# Patient Record
Sex: FEMALE | Race: WHITE | Employment: FULL TIME | URBAN - METROPOLITAN AREA
[De-identification: names, ages, dates, MRNs, and addresses within clinical notes are randomized per-mention and may not be internally consistent; named-entity substitution may affect disease eponyms.]

---

## 2017-02-10 PROBLEM — Z11.59 NEED FOR HEPATITIS C SCREENING TEST: Status: ACTIVE | Noted: 2017-02-10

## 2017-02-10 PROBLEM — Z00.00 LABORATORY EXAM ORDERED AS PART OF ROUTINE GENERAL MEDICAL EXAMINATION: Status: ACTIVE | Noted: 2017-02-10

## 2017-02-10 PROBLEM — Z12.31 ENCOUNTER FOR SCREENING MAMMOGRAM FOR BREAST CANCER: Status: ACTIVE | Noted: 2017-02-10

## 2017-02-10 PROBLEM — Z12.11 COLON CANCER SCREENING: Status: ACTIVE | Noted: 2017-02-10

## 2017-02-10 PROBLEM — H26.9 CATARACT OF LEFT EYE, UNSPECIFIED CATARACT TYPE: Status: ACTIVE | Noted: 2017-02-10

## 2017-02-10 PROBLEM — Z79.899 ENCOUNTER FOR LONG-TERM (CURRENT) USE OF MEDICATIONS: Status: ACTIVE | Noted: 2017-02-10

## 2017-03-24 PROBLEM — Z12.11 COLON CANCER SCREENING: Status: ACTIVE | Noted: 2017-03-24

## 2017-03-24 PROBLEM — H26.9: Status: ACTIVE | Noted: 2017-02-10

## 2017-03-24 PROBLEM — Z00.00 LABORATORY EXAM ORDERED AS PART OF ROUTINE GENERAL MEDICAL EXAMINATION: Status: ACTIVE | Noted: 2017-03-24

## 2017-03-24 PROBLEM — R73.01 ELEVATED FASTING GLUCOSE: Status: ACTIVE | Noted: 2017-03-24

## 2017-03-24 PROBLEM — Z79.899 ENCOUNTER FOR LONG-TERM (CURRENT) USE OF MEDICATIONS: Status: ACTIVE | Noted: 2017-03-24

## 2017-03-24 PROBLEM — Z12.31 ENCOUNTER FOR SCREENING MAMMOGRAM FOR BREAST CANCER: Status: ACTIVE | Noted: 2017-03-24

## 2017-03-24 PROBLEM — Z11.59 NEED FOR HEPATITIS C SCREENING TEST: Status: ACTIVE | Noted: 2017-03-24

## 2017-03-24 PROBLEM — E78.00 PURE HYPERCHOLESTEROLEMIA: Status: ACTIVE | Noted: 2017-03-24

## 2017-03-24 PROBLEM — I10 HYPERTENSION, ESSENTIAL, BENIGN: Status: ACTIVE | Noted: 2017-03-24

## 2017-06-11 PROBLEM — Z00.00 LABORATORY EXAM ORDERED AS PART OF ROUTINE GENERAL MEDICAL EXAMINATION: Status: ACTIVE | Noted: 2017-06-11

## 2017-06-11 PROBLEM — Z11.59 NEED FOR HEPATITIS C SCREENING TEST: Status: ACTIVE | Noted: 2017-06-11

## 2017-06-11 PROBLEM — Z12.31 ENCOUNTER FOR SCREENING MAMMOGRAM FOR BREAST CANCER: Status: ACTIVE | Noted: 2017-06-11

## 2017-06-11 PROBLEM — Z79.899 ENCOUNTER FOR LONG-TERM (CURRENT) USE OF MEDICATIONS: Status: ACTIVE | Noted: 2017-06-11

## 2017-06-11 PROBLEM — Z12.11 COLON CANCER SCREENING: Status: ACTIVE | Noted: 2017-06-11

## 2019-01-25 ENCOUNTER — APPOINTMENT (OUTPATIENT)
Dept: CT IMAGING | Facility: HOSPITAL | Age: 64
DRG: 101 | End: 2019-01-25
Attending: EMERGENCY MEDICINE
Payer: COMMERCIAL

## 2019-01-25 ENCOUNTER — APPOINTMENT (OUTPATIENT)
Dept: MRI IMAGING | Facility: HOSPITAL | Age: 64
DRG: 101 | End: 2019-01-25
Attending: Other
Payer: COMMERCIAL

## 2019-01-25 ENCOUNTER — APPOINTMENT (OUTPATIENT)
Dept: GENERAL RADIOLOGY | Facility: HOSPITAL | Age: 64
DRG: 101 | End: 2019-01-25
Attending: EMERGENCY MEDICINE
Payer: COMMERCIAL

## 2019-01-25 ENCOUNTER — HOSPITAL ENCOUNTER (INPATIENT)
Facility: HOSPITAL | Age: 64
LOS: 1 days | Discharge: HOME | DRG: 101 | End: 2019-01-26
Attending: EMERGENCY MEDICINE | Admitting: INTERNAL MEDICINE
Payer: COMMERCIAL

## 2019-01-25 DIAGNOSIS — R55 SYNCOPE AND COLLAPSE: Primary | ICD-10-CM

## 2019-01-25 LAB
ALBUMIN SERPL-MCNC: 3.7 G/DL (ref 3.1–4.5)
ALBUMIN/GLOB SERPL: 0.9 {RATIO} (ref 1–2)
ALP LIVER SERPL-CCNC: 117 U/L (ref 50–130)
ALT SERPL-CCNC: 61 U/L (ref 14–54)
ANION GAP SERPL CALC-SCNC: 12 MMOL/L (ref 0–18)
AST SERPL-CCNC: 63 U/L (ref 15–41)
ATRIAL RATE: 77 BPM
BASOPHILS # BLD AUTO: 0.05 X10(3) UL (ref 0–0.1)
BASOPHILS NFR BLD AUTO: 0.5 %
BILIRUB SERPL-MCNC: 1.3 MG/DL (ref 0.1–2)
BILIRUB UR QL STRIP.AUTO: NEGATIVE
BUN BLD-MCNC: 17 MG/DL (ref 8–20)
BUN/CREAT SERPL: 21 (ref 10–20)
CALCIUM BLD-MCNC: 9.3 MG/DL (ref 8.3–10.3)
CHLORIDE SERPL-SCNC: 108 MMOL/L (ref 101–111)
CLARITY UR REFRACT.AUTO: CLEAR
CO2 SERPL-SCNC: 22 MMOL/L (ref 22–32)
COLOR UR AUTO: YELLOW
CREAT BLD-MCNC: 0.81 MG/DL (ref 0.55–1.02)
D-DIMER: 0.68 UG/ML FEU (ref 0–0.49)
EOSINOPHIL # BLD AUTO: 0.06 X10(3) UL (ref 0–0.3)
EOSINOPHIL NFR BLD AUTO: 0.7 %
ERYTHROCYTE [DISTWIDTH] IN BLOOD BY AUTOMATED COUNT: 14.2 % (ref 11.5–16)
GLOBULIN PLAS-MCNC: 4.1 G/DL (ref 2.8–4.4)
GLUCOSE BLD-MCNC: 159 MG/DL (ref 70–99)
GLUCOSE UR STRIP.AUTO-MCNC: NEGATIVE MG/DL
HAV IGM SER QL: 2.1 MG/DL (ref 1.8–2.5)
HCT VFR BLD AUTO: 41.1 % (ref 34–50)
HGB BLD-MCNC: 14.5 G/DL (ref 12–16)
IMMATURE GRANULOCYTE COUNT: 0.19 X10(3) UL (ref 0–1)
IMMATURE GRANULOCYTE RATIO %: 2.1 %
INR BLD: 1.03 (ref 0.9–1.1)
KETONES UR STRIP.AUTO-MCNC: NEGATIVE MG/DL
LYMPHOCYTES # BLD AUTO: 1.13 X10(3) UL (ref 0.9–4)
LYMPHOCYTES NFR BLD AUTO: 12.3 %
M PROTEIN MFR SERPL ELPH: 7.8 G/DL (ref 6.4–8.2)
MCH RBC QN AUTO: 30.9 PG (ref 27–33.2)
MCHC RBC AUTO-ENTMCNC: 35.3 G/DL (ref 31–37)
MCV RBC AUTO: 87.6 FL (ref 81–100)
MONOCYTES # BLD AUTO: 0.57 X10(3) UL (ref 0.1–1)
MONOCYTES NFR BLD AUTO: 6.2 %
NEUTROPHIL ABS PRELIM: 7.17 X10 (3) UL (ref 1.3–6.7)
NEUTROPHILS # BLD AUTO: 7.17 X10(3) UL (ref 1.3–6.7)
NEUTROPHILS NFR BLD AUTO: 78.2 %
NITRITE UR QL STRIP.AUTO: NEGATIVE
OSMOLALITY SERPL CALC.SUM OF ELEC: 299 MOSM/KG (ref 275–295)
P AXIS: 50 DEGREES
P-R INTERVAL: 202 MS
PH UR STRIP.AUTO: 5 [PH] (ref 4.5–8)
PLATELET # BLD AUTO: 196 10(3)UL (ref 150–450)
POTASSIUM SERPL-SCNC: 3.9 MMOL/L (ref 3.6–5.1)
PRO-BETA NATRIURETIC PEPTIDE: 859 PG/ML (ref ?–125)
PROT UR STRIP.AUTO-MCNC: NEGATIVE MG/DL
PSA SERPL DL<=0.01 NG/ML-MCNC: 13.9 SECONDS (ref 12.4–14.7)
Q-T INTERVAL: 480 MS
QRS DURATION: 154 MS
QTC CALCULATION (BEZET): 543 MS
R AXIS: -34 DEGREES
RBC # BLD AUTO: 4.69 X10(6)UL (ref 3.8–5.1)
RED CELL DISTRIBUTION WIDTH-SD: 45.1 FL (ref 35.1–46.3)
SODIUM SERPL-SCNC: 142 MMOL/L (ref 136–144)
SP GR UR STRIP.AUTO: 1.02 (ref 1–1.03)
T AXIS: 110 DEGREES
TROPONIN I SERPL-MCNC: <0.046 NG/ML (ref ?–0.05)
UROBILINOGEN UR STRIP.AUTO-MCNC: 1 MG/DL
VENTRICULAR RATE: 77 BPM
WBC # BLD AUTO: 9.2 X10(3) UL (ref 4–13)

## 2019-01-25 PROCEDURE — 83735 ASSAY OF MAGNESIUM: CPT | Performed by: EMERGENCY MEDICINE

## 2019-01-25 PROCEDURE — 83880 ASSAY OF NATRIURETIC PEPTIDE: CPT | Performed by: EMERGENCY MEDICINE

## 2019-01-25 PROCEDURE — 85378 FIBRIN DEGRADE SEMIQUANT: CPT | Performed by: EMERGENCY MEDICINE

## 2019-01-25 PROCEDURE — 99285 EMERGENCY DEPT VISIT HI MDM: CPT | Performed by: EMERGENCY MEDICINE

## 2019-01-25 PROCEDURE — 70450 CT HEAD/BRAIN W/O DYE: CPT | Performed by: EMERGENCY MEDICINE

## 2019-01-25 PROCEDURE — 96360 HYDRATION IV INFUSION INIT: CPT | Performed by: EMERGENCY MEDICINE

## 2019-01-25 PROCEDURE — 81001 URINALYSIS AUTO W/SCOPE: CPT | Performed by: EMERGENCY MEDICINE

## 2019-01-25 PROCEDURE — 84484 ASSAY OF TROPONIN QUANT: CPT | Performed by: EMERGENCY MEDICINE

## 2019-01-25 PROCEDURE — 96361 HYDRATE IV INFUSION ADD-ON: CPT | Performed by: EMERGENCY MEDICINE

## 2019-01-25 PROCEDURE — 80053 COMPREHEN METABOLIC PANEL: CPT | Performed by: EMERGENCY MEDICINE

## 2019-01-25 PROCEDURE — 93010 ELECTROCARDIOGRAM REPORT: CPT | Performed by: EMERGENCY MEDICINE

## 2019-01-25 PROCEDURE — 87086 URINE CULTURE/COLONY COUNT: CPT | Performed by: EMERGENCY MEDICINE

## 2019-01-25 PROCEDURE — 95816 EEG AWAKE AND DROWSY: CPT

## 2019-01-25 PROCEDURE — 71045 X-RAY EXAM CHEST 1 VIEW: CPT | Performed by: EMERGENCY MEDICINE

## 2019-01-25 PROCEDURE — 93005 ELECTROCARDIOGRAM TRACING: CPT

## 2019-01-25 PROCEDURE — 70551 MRI BRAIN STEM W/O DYE: CPT | Performed by: OTHER

## 2019-01-25 PROCEDURE — 71275 CT ANGIOGRAPHY CHEST: CPT | Performed by: EMERGENCY MEDICINE

## 2019-01-25 PROCEDURE — 85025 COMPLETE CBC W/AUTO DIFF WBC: CPT | Performed by: EMERGENCY MEDICINE

## 2019-01-25 PROCEDURE — 85610 PROTHROMBIN TIME: CPT | Performed by: EMERGENCY MEDICINE

## 2019-01-25 RX ORDER — HEPARIN SODIUM 5000 [USP'U]/ML
5000 INJECTION, SOLUTION INTRAVENOUS; SUBCUTANEOUS EVERY 8 HOURS SCHEDULED
Status: DISCONTINUED | OUTPATIENT
Start: 2019-01-25 | End: 2019-01-27

## 2019-01-25 RX ORDER — ASPIRIN 81 MG/1
81 TABLET ORAL DAILY
Status: DISCONTINUED | OUTPATIENT
Start: 2019-01-25 | End: 2019-01-27

## 2019-01-25 RX ORDER — LEVETIRACETAM 500 MG/1
500 TABLET ORAL 2 TIMES DAILY
Status: DISCONTINUED | OUTPATIENT
Start: 2019-01-25 | End: 2019-01-27

## 2019-01-25 RX ORDER — ACETAMINOPHEN 325 MG/1
TABLET ORAL
Status: DISPENSED
Start: 2019-01-25 | End: 2019-01-26

## 2019-01-25 RX ORDER — ACETAMINOPHEN 325 MG/1
650 TABLET ORAL EVERY 6 HOURS PRN
Status: DISCONTINUED | OUTPATIENT
Start: 2019-01-25 | End: 2019-01-27

## 2019-01-25 RX ORDER — ATORVASTATIN CALCIUM 20 MG/1
20 TABLET, FILM COATED ORAL NIGHTLY
Status: DISCONTINUED | OUTPATIENT
Start: 2019-01-25 | End: 2019-01-27

## 2019-01-25 RX ORDER — METOPROLOL SUCCINATE 100 MG/1
100 TABLET, EXTENDED RELEASE ORAL DAILY
Status: ON HOLD | COMMUNITY
End: 2019-01-26

## 2019-01-25 RX ORDER — ATORVASTATIN CALCIUM 20 MG/1
20 TABLET, FILM COATED ORAL NIGHTLY
COMMUNITY
End: 2019-07-12 | Stop reason: ALTCHOICE

## 2019-01-25 RX ORDER — METOPROLOL SUCCINATE 25 MG/1
25 TABLET, EXTENDED RELEASE ORAL
Status: DISCONTINUED | OUTPATIENT
Start: 2019-01-25 | End: 2019-01-27

## 2019-01-25 RX ORDER — LORAZEPAM 2 MG/ML
1 INJECTION INTRAMUSCULAR ONCE
Status: COMPLETED | OUTPATIENT
Start: 2019-01-25 | End: 2019-01-25

## 2019-01-25 RX ORDER — ASPIRIN 81 MG/1
81 TABLET ORAL DAILY
COMMUNITY

## 2019-01-25 RX ORDER — SODIUM CHLORIDE 9 MG/ML
INJECTION, SOLUTION INTRAVENOUS CONTINUOUS
Status: DISCONTINUED | OUTPATIENT
Start: 2019-01-25 | End: 2019-01-27

## 2019-01-25 NOTE — ED PROVIDER NOTES
Patient Seen in: [facility] Emergency Department    History   Patient presents with:  Syncope (cardiovascular, neurologic)    Stated Complaint: Syncopy    HPI    73-year-old female presents emergency room for evaluation after syncopal episode.   Karin 124/66   Pulse 84   Resp 14   Temp 97 °F (36.1 °C)   Temp src Temporal   SpO2 100 %   O2 Device None (Room air)       Current:/77   Pulse 80   Temp 97 °F (36.1 °C) (Temporal)   Resp 18   Ht 154.9 cm (5' 1\")   Wt 80.3 kg   SpO2 98%   BMI 33.44 kg/m² Calculated Osmolality 299 (*)     AST 63 (*)     Alt 61 (*)     A/G Ratio 0.9 (*)     All other components within normal limits   URINALYSIS WITH CULTURE REFLEX - Abnormal; Notable for the following components:    Blood Urine Small (*)     Leukocyte Es rhythm, no acute ST or T wave abnormality. No acute change compared to previous EKG dated 8/16/2018. MDM   Patient had IV established, placed on cardiac monitor and pulse ox.   Patient has remained symptom-free throughout ER evaluation,

## 2019-01-25 NOTE — PROGRESS NOTES
NURSING ADMISSION NOTE      Patient admitted via ED  Oriented to room. Safety precautions initiated. Bed in low position. Call light in reach.     Assumed care of patient at 1245  AOx4, RA, NSR on tele  Denies pain  Denies CP, dizziness  PTA med rec

## 2019-01-25 NOTE — ED INITIAL ASSESSMENT (HPI)
A/o x3, ambulatory, pt reports fainting two hours prior to arrival, felt dizzy yesterday morning but felt better in the evening. Today morning was sitting by table and woke up in the floor.  Reports being unconscious for ten minutes Denies blurry vision or

## 2019-01-25 NOTE — CONSULTS
[facility] Cardiology Consultation    Brenna Norwood Patient Status:  Inpatient    1955 MRN [id number]   [facility] 7NE-A Attending Jerry Dougherty MD   Hosp Day # 0 PCP Kristan Cabrera MD     Reason for Consultation:  syncope 122/75    Last 3 Weights  01/25/19 0852 : 177 lb (80.3 kg)  08/16/18 1514 : 187 lb (84.8 kg)  04/23/18 1112 : 197 lb (89.4 kg)          General: No apparent distress  HEENT: No focal deficits. Neck: supple.  JVP normal  Cardiac: Regular rhythm, S1, S2 norm

## 2019-01-25 NOTE — H&P
[facility] Hospitalist History and Physical      CC: Syncope    PCP: Abe Lowe MD    History of Present Illness: Patient is a 66year old female with PMH sig bicuspid AV s/p AVR. She follows with Hollie Chang. Known LBBB.  Had episode of near syncope ye HPI    Objective:  /75 (BP Location: Left arm)   Pulse 74   Temp 98.6 °F (37 °C) (Oral)   Resp 14   Ht 5' 1\" (1.549 m)   Wt 177 lb (80.3 kg)   SpO2 99%   BMI 33.44 kg/m²     Gen: No acute distress  Eyes: Pink conjunctivae, No ptosis  Neck: No masses

## 2019-01-26 ENCOUNTER — APPOINTMENT (OUTPATIENT)
Dept: CV DIAGNOSTICS | Facility: HOSPITAL | Age: 64
DRG: 101 | End: 2019-01-26
Attending: INTERNAL MEDICINE
Payer: COMMERCIAL

## 2019-01-26 VITALS
DIASTOLIC BLOOD PRESSURE: 70 MMHG | BODY MASS INDEX: 33.42 KG/M2 | RESPIRATION RATE: 13 BRPM | HEIGHT: 61 IN | OXYGEN SATURATION: 97 % | TEMPERATURE: 98 F | SYSTOLIC BLOOD PRESSURE: 115 MMHG | WEIGHT: 177 LBS | HEART RATE: 102 BPM

## 2019-01-26 LAB
ALBUMIN SERPL-MCNC: 3.4 G/DL (ref 3.1–4.5)
ALP LIVER SERPL-CCNC: 108 U/L (ref 50–130)
ALT SERPL-CCNC: 54 U/L (ref 14–54)
ANION GAP SERPL CALC-SCNC: 10 MMOL/L (ref 0–18)
AST SERPL-CCNC: 47 U/L (ref 15–41)
BASOPHILS # BLD AUTO: 0.02 X10(3) UL (ref 0–0.1)
BASOPHILS NFR BLD AUTO: 0.4 %
BILIRUB DIRECT SERPL-MCNC: 0.3 MG/DL (ref 0.1–0.5)
BILIRUB SERPL-MCNC: 1.1 MG/DL (ref 0.1–2)
BUN BLD-MCNC: 12 MG/DL (ref 8–20)
BUN/CREAT SERPL: 19 (ref 10–20)
CALCIUM BLD-MCNC: 9.1 MG/DL (ref 8.3–10.3)
CHLORIDE SERPL-SCNC: 110 MMOL/L (ref 101–111)
CO2 SERPL-SCNC: 23 MMOL/L (ref 22–32)
CREAT BLD-MCNC: 0.63 MG/DL (ref 0.55–1.02)
EOSINOPHIL # BLD AUTO: 0.1 X10(3) UL (ref 0–0.3)
EOSINOPHIL NFR BLD AUTO: 1.8 %
ERYTHROCYTE [DISTWIDTH] IN BLOOD BY AUTOMATED COUNT: 14.1 % (ref 11.5–16)
GLUCOSE BLD-MCNC: 109 MG/DL (ref 70–99)
HCT VFR BLD AUTO: 40 % (ref 34–50)
HGB BLD-MCNC: 14 G/DL (ref 12–16)
IMMATURE GRANULOCYTE COUNT: 0.06 X10(3) UL (ref 0–1)
IMMATURE GRANULOCYTE RATIO %: 1.1 %
LYMPHOCYTES # BLD AUTO: 1.74 X10(3) UL (ref 0.9–4)
LYMPHOCYTES NFR BLD AUTO: 32 %
M PROTEIN MFR SERPL ELPH: 7.5 G/DL (ref 6.4–8.2)
MCH RBC QN AUTO: 30.6 PG (ref 27–33.2)
MCHC RBC AUTO-ENTMCNC: 35 G/DL (ref 31–37)
MCV RBC AUTO: 87.5 FL (ref 81–100)
MONOCYTES # BLD AUTO: 0.52 X10(3) UL (ref 0.1–1)
MONOCYTES NFR BLD AUTO: 9.6 %
NEUTROPHIL ABS PRELIM: 3 X10 (3) UL (ref 1.3–6.7)
NEUTROPHILS # BLD AUTO: 3 X10(3) UL (ref 1.3–6.7)
NEUTROPHILS NFR BLD AUTO: 55.1 %
OSMOLALITY SERPL CALC.SUM OF ELEC: 296 MOSM/KG (ref 275–295)
PLATELET # BLD AUTO: 168 10(3)UL (ref 150–450)
POTASSIUM SERPL-SCNC: 3.8 MMOL/L (ref 3.6–5.1)
RBC # BLD AUTO: 4.57 X10(6)UL (ref 3.8–5.1)
RED CELL DISTRIBUTION WIDTH-SD: 43.8 FL (ref 35.1–46.3)
SODIUM SERPL-SCNC: 143 MMOL/L (ref 136–144)
WBC # BLD AUTO: 5.4 X10(3) UL (ref 4–13)

## 2019-01-26 PROCEDURE — 85025 COMPLETE CBC W/AUTO DIFF WBC: CPT | Performed by: HOSPITALIST

## 2019-01-26 PROCEDURE — 93306 TTE W/DOPPLER COMPLETE: CPT | Performed by: INTERNAL MEDICINE

## 2019-01-26 PROCEDURE — 80076 HEPATIC FUNCTION PANEL: CPT | Performed by: INTERNAL MEDICINE

## 2019-01-26 PROCEDURE — 80048 BASIC METABOLIC PNL TOTAL CA: CPT | Performed by: HOSPITALIST

## 2019-01-26 RX ORDER — LEVETIRACETAM 500 MG/1
500 TABLET ORAL 2 TIMES DAILY
Qty: 60 TABLET | Refills: 3 | Status: SHIPPED | OUTPATIENT
Start: 2019-01-26 | End: 2019-02-26

## 2019-01-26 RX ORDER — METOPROLOL SUCCINATE 25 MG/1
25 TABLET, EXTENDED RELEASE ORAL DAILY
Qty: 30 TABLET | Refills: 1 | Status: SHIPPED | OUTPATIENT
Start: 2019-01-26 | End: 2019-03-25

## 2019-01-26 RX ORDER — POTASSIUM CHLORIDE 20 MEQ/1
40 TABLET, EXTENDED RELEASE ORAL ONCE
Status: DISCONTINUED | OUTPATIENT
Start: 2019-01-26 | End: 2019-01-27

## 2019-01-26 NOTE — PROGRESS NOTES
01/25/19 1827   Clinical Encounter Type   Visited With Patient   Routine Visit Introduction  ( initiated POA discussion with patient who will discuss further with spouse.)   Patient Spiritual Encounters   Spiritual Interventions  provide

## 2019-01-26 NOTE — PROGRESS NOTES
Neurology follow up NOTE    SUBJECTIVE:  She has no more episodes of syncope or dizziness. No headaches.      OBJECTIVE:   /68 (BP Location: Left arm)   Pulse 80   Temp 98.2 °F (36.8 °C) (Oral)   Resp 14   Ht 5' 1\" (1.549 m)   Wt 177 lb (80.3 kg)   S RUE: 2+/4     RLE: 2+/4     LUE: 2+/4     LLE: 2+/4    Ct Brain Or Head (24548)    Result Date: 1/25/2019  CONCLUSION:  No acute intracranial abnormality.     Dictated by: Darrell Borrero MD on 1/25/2019 at 10:53     Approved by: Darrell Borrero MD            Ct An three months. 3. F/U neuro in 3 months. 4. Call neuro as needed.

## 2019-01-26 NOTE — PROCEDURES
[facility], [address] [place] [address]      PATIENT'S NAME: Catherine Summers   ATTENDING PHYSICIAN: Ike Puckett M.D.    PATIENT ACCOUNT #: [de-identified] LOCATION: [address] [place] [address]   MEDICAL RECORD #: [id number] DATE OF B

## 2019-01-26 NOTE — DISCHARGE SUMMARY
General Medicine Discharge Summary     Patient ID:  Lauren Ryder  66year old  4/21/1955    Admit date: 1/25/2019    Discharge date and time: 1/26/2019    Attending Physician: Bennett Reed MD 20 mg by mouth nightly. Metoprolol Succinate  MG Oral Tablet 24 Hr  Take 100 mg by mouth daily.           Activity: activity as tolerated  Diet: cardiac diet  Wound Care: as directed  Code Status: Full Code      Exam on day of discharge:      01/26

## 2019-01-26 NOTE — PROGRESS NOTES
ADDENDUM:  The patient was personally seen and examined by me.  I agree with the note written below with the following comments:    S: no events overnight; telemetry- SR with PVCs    O: /65 (BP Location: Left arm)   Pulse 75   Temp 98.3 °F (36.8 °C) ( Blocker   • Heparin Sodium (Porcine)  5,000 Units Subcutaneous Q8H [address]   • levETIRAcetam  500 mg Oral BID       Continuous Infusions:  • sodium chloride 125 mL/hr at 01/25/19 1002     Allergies:  No Known Allergies    Intake/Output:    Intake/Output Summar

## 2019-01-26 NOTE — PLAN OF CARE
Assumed care of patient at 1900. Alert and oriented x4. Patient down for MRI, unable to tolerate, RN called Dr. Marielos Lunsford and orders received for IV Ativan x1. Given to patient and tolerated MRI. Denies any dizziness. SR on tele.  Room air with clear/diminished

## 2019-01-26 NOTE — CONSULTS
[facility]    PATIENT'S NAME: Gertrudismaria teresa Zarate   ATTENDING PHYSICIAN: Tonny Mark M.D.   CONSULTING PHYSICIAN: Kim Smith M.D.    PATIENT ACCOUNT#:   [de-identified]    LOCATION:  [address] [place] [address]  MEDICAL RECORD #:   [id number]       DATE OF BIRTH: room air. HEENT:  Normocephalic. There is hematoma over the left frontal region due to the fall. She did have tongue biting and has bruises on her tongue. NEUROLOGIC:  She is alert, oriented to person, place, time. Speech fluent.   Concentration and at

## 2019-01-27 NOTE — PLAN OF CARE
NURSING DISCHARGE NOTE    Discharged 1/26/2019 via wheelchair to home. Accompanied by spouse. Belongings returned to patient. Tele monitor off, peripheral IV saline locked & removed. Discharge instructions given, patient verbalized understanding.

## 2019-02-05 NOTE — PAYOR COMM NOTE
--------------  ADMISSION REVIEW     Payor: [employer]  Subscriber #:  [id number]  Authorization Number: [id number]    Admit date: 1/25/19  Admit time: Rona Lopez 303       Admitting Physician: Lino Taylor MD  Primary Care Physician: Frederick Guidry (*)     AST 63 (*)     Alt 61 (*)     A/G Ratio 0.9 (*)     All other components within normal limits   URINALYSIS WITH CULTURE REFLEX - Abnormal; Notable for the following components:    Blood Urine Small (*)     Leukocyte Esterase Urine Small (*)     Squ was working at computer and woke up on ground. Hit her head. No dizziness or weakness prior. No fever or chills. No recent illness. Has been eating well. No CP. On BB. No weakness of numbness. No HA. No loss of bladder/bowel function.     Continuous at time of surgery due to challenge of removal & it was not causing any problems. On statin. Echo with moderate LVH and calcification of the MV apparatus, LVOT. 4. HTN- stable  5. LBBB     Plan:  Was on 100 mg of Toprol.   Will write for 25 mg daily, but f SYNCOPE/SEIZURE.

## 2019-02-26 PROCEDURE — 36415 COLL VENOUS BLD VENIPUNCTURE: CPT | Performed by: OTHER

## 2019-02-26 PROCEDURE — 80177 DRUG SCRN QUAN LEVETIRACETAM: CPT | Performed by: OTHER

## 2019-03-20 PROBLEM — F32.A MILD DEPRESSION: Status: ACTIVE | Noted: 2019-03-20

## 2019-03-20 PROBLEM — R56.9 SEIZURE-LIKE ACTIVITY (HCC): Status: ACTIVE | Noted: 2019-03-20

## 2019-04-04 PROBLEM — H93.299 ABNORMAL AUDITORY PERCEPTION, UNSPECIFIED LATERALITY: Status: ACTIVE | Noted: 2019-04-04

## 2019-12-06 PROCEDURE — 88305 TISSUE EXAM BY PATHOLOGIST: CPT | Performed by: INTERNAL MEDICINE

## 2020-06-28 PROBLEM — R55 SYNCOPE AND COLLAPSE: Status: RESOLVED | Noted: 2019-01-25 | Resolved: 2020-06-28

## 2021-07-06 PROBLEM — E78.2 MIXED HYPERLIPIDEMIA: Status: ACTIVE | Noted: 2021-07-06

## 2021-07-06 PROBLEM — I10 ESSENTIAL HYPERTENSION: Status: ACTIVE | Noted: 2017-03-24

## 2021-09-17 ENCOUNTER — APPOINTMENT (OUTPATIENT)
Dept: CT IMAGING | Facility: HOSPITAL | Age: 66
End: 2021-09-17
Attending: EMERGENCY MEDICINE
Payer: COMMERCIAL

## 2021-09-17 ENCOUNTER — HOSPITAL ENCOUNTER (EMERGENCY)
Facility: HOSPITAL | Age: 66
Discharge: HOME | End: 2021-09-17
Attending: EMERGENCY MEDICINE
Payer: COMMERCIAL

## 2021-09-17 VITALS
SYSTOLIC BLOOD PRESSURE: 127 MMHG | WEIGHT: 190 LBS | HEIGHT: 61 IN | RESPIRATION RATE: 16 BRPM | DIASTOLIC BLOOD PRESSURE: 66 MMHG | HEART RATE: 72 BPM | TEMPERATURE: 97 F | OXYGEN SATURATION: 98 % | BODY MASS INDEX: 35.87 KG/M2

## 2021-09-17 DIAGNOSIS — R42 DIZZINESS: Primary | ICD-10-CM

## 2021-09-17 LAB
ALBUMIN SERPL-MCNC: 3.3 G/DL (ref 3.4–5)
ALBUMIN/GLOB SERPL: 0.8 {RATIO} (ref 1–2)
ALP LIVER SERPL-CCNC: 136 U/L
ALT SERPL-CCNC: 54 U/L
ANION GAP SERPL CALC-SCNC: 6 MMOL/L (ref 0–18)
AST SERPL-CCNC: 41 U/L (ref 15–37)
BASOPHILS # BLD AUTO: 0.05 X10(3) UL (ref 0–0.2)
BASOPHILS NFR BLD AUTO: 0.5 %
BILIRUB SERPL-MCNC: 1.3 MG/DL (ref 0.1–2)
BUN BLD-MCNC: 15 MG/DL (ref 7–18)
CALCIUM BLD-MCNC: 9.2 MG/DL (ref 8.5–10.1)
CHLORIDE SERPL-SCNC: 107 MMOL/L (ref 98–112)
CO2 SERPL-SCNC: 24 MMOL/L (ref 21–32)
CREAT BLD-MCNC: 0.76 MG/DL
EOSINOPHIL # BLD AUTO: 0.02 X10(3) UL (ref 0–0.7)
EOSINOPHIL NFR BLD AUTO: 0.2 %
ERYTHROCYTE [DISTWIDTH] IN BLOOD BY AUTOMATED COUNT: 13.5 %
GLOBULIN PLAS-MCNC: 4.4 G/DL (ref 2.8–4.4)
GLUCOSE BLD-MCNC: 172 MG/DL (ref 70–99)
HCT VFR BLD AUTO: 39.1 %
HGB BLD-MCNC: 13.5 G/DL
IMM GRANULOCYTES # BLD AUTO: 0.23 X10(3) UL (ref 0–1)
IMM GRANULOCYTES NFR BLD: 2.3 %
LYMPHOCYTES # BLD AUTO: 1.34 X10(3) UL (ref 1–4)
LYMPHOCYTES NFR BLD AUTO: 13.4 %
MCH RBC QN AUTO: 30.6 PG (ref 26–34)
MCHC RBC AUTO-ENTMCNC: 34.5 G/DL (ref 31–37)
MCV RBC AUTO: 88.7 FL
MONOCYTES # BLD AUTO: 0.56 X10(3) UL (ref 0.1–1)
MONOCYTES NFR BLD AUTO: 5.6 %
NEUTROPHILS # BLD AUTO: 7.77 X10 (3) UL (ref 1.5–7.7)
NEUTROPHILS # BLD AUTO: 7.77 X10(3) UL (ref 1.5–7.7)
NEUTROPHILS NFR BLD AUTO: 78 %
OSMOLALITY SERPL CALC.SUM OF ELEC: 289 MOSM/KG (ref 275–295)
PLATELET # BLD AUTO: 180 10(3)UL (ref 150–450)
POTASSIUM SERPL-SCNC: 4.4 MMOL/L (ref 3.5–5.1)
PROT SERPL-MCNC: 7.7 G/DL (ref 6.4–8.2)
RBC # BLD AUTO: 4.41 X10(6)UL
SODIUM SERPL-SCNC: 137 MMOL/L (ref 136–145)
TROPONIN I SERPL-MCNC: <0.045 NG/ML (ref ?–0.04)
WBC # BLD AUTO: 10 X10(3) UL (ref 4–11)

## 2021-09-17 PROCEDURE — 36415 COLL VENOUS BLD VENIPUNCTURE: CPT

## 2021-09-17 PROCEDURE — 80053 COMPREHEN METABOLIC PANEL: CPT | Performed by: EMERGENCY MEDICINE

## 2021-09-17 PROCEDURE — 70450 CT HEAD/BRAIN W/O DYE: CPT | Performed by: EMERGENCY MEDICINE

## 2021-09-17 PROCEDURE — 99284 EMERGENCY DEPT VISIT MOD MDM: CPT

## 2021-09-17 PROCEDURE — 85025 COMPLETE CBC W/AUTO DIFF WBC: CPT | Performed by: EMERGENCY MEDICINE

## 2021-09-17 PROCEDURE — 93005 ELECTROCARDIOGRAM TRACING: CPT

## 2021-09-17 PROCEDURE — 93010 ELECTROCARDIOGRAM REPORT: CPT

## 2021-09-17 PROCEDURE — 84484 ASSAY OF TROPONIN QUANT: CPT | Performed by: EMERGENCY MEDICINE

## 2021-09-17 NOTE — ED PROVIDER NOTES
Patient Seen in: [facility] Emergency Department      History   Patient presents with:  Dizziness    Stated Complaint: dizziness resolved    Subjective:   HPI    Yuliya Crenshaw is a pleasant 79-year-old female presenting with dizziness.   She reports that th signs reviewed. All other systems reviewed and negative except as noted above.     Physical Exam     ED Triage Vitals [09/17/21 1558]   BP (!) 150/94   Pulse 90   Resp 17   Temp 97.4 °F (36.3 °C)   Temp src Temporal   SpO2 97 %   O2 Device None (Room a The following orders were created for panel order CBC With Differential With Platelet.   Procedure                               Abnormality         Status                     ---------                               -----------         ------

## 2021-09-18 LAB
ATRIAL RATE: 75 BPM
P AXIS: 43 DEGREES
P-R INTERVAL: 176 MS
Q-T INTERVAL: 476 MS
QRS DURATION: 112 MS
QTC CALCULATION (BEZET): 531 MS
R AXIS: -34 DEGREES
T AXIS: 72 DEGREES
VENTRICULAR RATE: 75 BPM